# Patient Record
Sex: MALE | Race: WHITE | NOT HISPANIC OR LATINO
[De-identification: names, ages, dates, MRNs, and addresses within clinical notes are randomized per-mention and may not be internally consistent; named-entity substitution may affect disease eponyms.]

---

## 2020-05-20 PROBLEM — Z00.00 ENCOUNTER FOR PREVENTIVE HEALTH EXAMINATION: Status: ACTIVE | Noted: 2020-05-20

## 2020-05-21 ENCOUNTER — APPOINTMENT (OUTPATIENT)
Dept: RADIATION ONCOLOGY | Facility: CLINIC | Age: 65
End: 2020-05-21
Payer: MEDICARE

## 2020-05-21 PROCEDURE — 99442: CPT | Mod: 95

## 2020-05-28 NOTE — DATA REVIEWED
[FreeTextEntry1] : CT scan chest abdomen and pelvis from [facility] 04/20/2020: See HPI\par \par PET CT scan from [facility] 5/1/2020: See HPI

## 2020-05-28 NOTE — HISTORY OF PRESENT ILLNESS
[FreeTextEntry1] : TELEPHONE FOLLOW-UP\par THE PATIENT WAS CALLED ON THE PHONE NUMBER HE PROVIDED [phone]\par HIS WIFE WAS PRESENT FOR AND PARTICIPATED IN THE PHONE CALL\par VERBAL CONSENT WAS OBTAINED FROM THE PATIENT TO PROCEED WITH THE VISIT\par \par Mr. Beckham was last seen in our office for follow-up on October 30, 2019 at which time he was nearly 1 month status post completion of radiation therapy to the GE junction.\par \par Since that time he and his wife have traveled to [place] for the winter.  Because of COVID they have decided to remain in [place].  He had been seeing a medical oncologist in [place] initially for port flushing.  The medical oncologist, Dr.Renjipha Whipple, was concerned with a delay of routine imaging and thus ordered a CAT scan of the thorax abdomen and pelvis at [facility] which was done on April 17 of this year.  This revealed a new nodule in the right upper lung measuring 0.7 x 0.7 cm, retroperitoneal lymphadenopathy measuring up to 3.8 cm and right common external iliac lymphadenopathy measuring up to 1.4 cm.  \par \par A PET CT scan was performed on May 1, 2020 at [facility] in [place].  This revealed a hypermetabolic peritoneal lymphadenopathy, no hypermetabolic mesenteric lymph nodes or external iliac or pelvic sidewall lymph nodes.  There were no hypermetabolic lung nodules.  There is a small 0.6 cm nodule in the right upper lung medially without increased metabolic activity.\par \par He is scheduled to undergo a laparoscopic biopsy with Dr. Eric Young of [facility] on May 28, 2020 at [facility] in [facility].  After his biopsy he will return to his medical oncologist for follow-up on June 1 to discuss management.\par \par He reports that he has been feeling well overall.  He has no fatigue, good appetite, a 20 pound weight gain since completing radiotherapy.  He has no nausea, no vomiting, no dysphagia or diet aphasia.  Chest pain, no cough, sputum production or hemoptysis.  He has no back pain, no bone pain, no anorexia, weight loss or fatigue.\par \par PET CT scan on April 30, 2020 showed a small 0.6 cm nodule in the right upper lobe lung medially without increased metabolic activity.  Mild thickening along the lesser curvature of the stomach with minimal metabolic activity.  Hypermetabolic retroperitoneal lymph nodes compatible with metastatic disease.

## 2020-05-28 NOTE — DISEASE MANAGEMENT
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

## 2020-11-10 ENCOUNTER — APPOINTMENT (OUTPATIENT)
Dept: PULMONOLOGY | Facility: HOSPITAL | Age: 65
End: 2020-11-10
Payer: MEDICARE

## 2020-11-10 VITALS — WEIGHT: 315 LBS | HEIGHT: 70 IN | BODY MASS INDEX: 45.1 KG/M2

## 2020-11-10 DIAGNOSIS — Z87.891 PERSONAL HISTORY OF NICOTINE DEPENDENCE: ICD-10-CM

## 2020-11-10 DIAGNOSIS — M19.90 UNSPECIFIED OSTEOARTHRITIS, UNSPECIFIED SITE: ICD-10-CM

## 2020-11-10 DIAGNOSIS — I10 ESSENTIAL (PRIMARY) HYPERTENSION: ICD-10-CM

## 2020-11-10 DIAGNOSIS — Z78.9 OTHER SPECIFIED HEALTH STATUS: ICD-10-CM

## 2020-11-10 DIAGNOSIS — I25.10 ATHEROSCLEROTIC HEART DISEASE OF NATIVE CORONARY ARTERY W/OUT ANGINA PECTORIS: ICD-10-CM

## 2020-11-10 PROCEDURE — 99213 OFFICE O/P EST LOW 20 MIN: CPT | Mod: 95

## 2020-11-10 RX ORDER — APIXABAN 2.5 MG/1
2.5 TABLET, FILM COATED ORAL
Qty: 180 | Refills: 0 | Status: ACTIVE | COMMUNITY
Start: 2020-10-17

## 2020-11-10 RX ORDER — AMLODIPINE BESYLATE 5 MG/1
5 TABLET ORAL
Qty: 90 | Refills: 0 | Status: ACTIVE | COMMUNITY
Start: 2020-08-03

## 2020-11-10 RX ORDER — ATORVASTATIN CALCIUM 80 MG/1
80 TABLET, FILM COATED ORAL
Qty: 90 | Refills: 0 | Status: ACTIVE | COMMUNITY
Start: 2020-08-03

## 2020-11-10 RX ORDER — APREPITANT 125MG-80MG
80 & 125 KIT ORAL
Qty: 6 | Refills: 0 | Status: ACTIVE | COMMUNITY
Start: 2020-10-31

## 2020-11-10 RX ORDER — METOPROLOL TARTRATE 75 MG/1
TABLET, FILM COATED ORAL
Refills: 0 | Status: ACTIVE | COMMUNITY

## 2020-11-10 RX ORDER — ASPIRIN 81 MG/1
81 TABLET, CHEWABLE ORAL
Qty: 90 | Refills: 0 | Status: ACTIVE | COMMUNITY
Start: 2020-06-15

## 2020-11-10 RX ORDER — PANTOPRAZOLE 40 MG/1
40 TABLET, DELAYED RELEASE ORAL
Qty: 90 | Refills: 0 | Status: ACTIVE | COMMUNITY
Start: 2020-06-15

## 2020-11-10 NOTE — HISTORY OF PRESENT ILLNESS
[FreeTextEntry1] : PMD: Dr. Rell Benavides\par cardiology: Dr. Chip Enriquez\par \par History of present illness:\par 65 year-old man with a history of cad s.p stents jan/18, htn, venous stasis, arthritis, gerd, stage 4 esophageal cancer is [facility] to address sleep apnea.  Patients symptoms are better - snoring and daytime sleepiness improved.\par pt is compliant and benefitted with cpap.\par average usage is 8 hrs\par on auto 9-13 cm\par ahi 1.3\par dme apria\par uses fullface mask

## 2020-11-10 NOTE — ASSESSMENT
[FreeTextEntry1] : 65 year  old man  with sleep apnea is doing well with the CPAP.  Patient is compliant with the CPAP and benefited significantly with the CPAP.\par

## 2020-11-10 NOTE — REASON FOR VISIT
[Follow-Up] : a follow-up visit [Home] : at home, [unfilled] , at the time of the visit. [Medical Office: ([facility])___] : at the medical office located in  [Verbal consent obtained from patient] : the patient, [unfilled] [FreeTextEntry1] : sleep apnea

## 2021-08-26 ENCOUNTER — APPOINTMENT (OUTPATIENT)
Dept: RADIATION ONCOLOGY | Facility: CLINIC | Age: 66
End: 2021-08-26
Payer: MEDICARE

## 2021-08-26 VITALS
RESPIRATION RATE: 20 BRPM | SYSTOLIC BLOOD PRESSURE: 147 MMHG | HEIGHT: 70 IN | BODY MASS INDEX: 45.1 KG/M2 | WEIGHT: 315 LBS | HEART RATE: 65 BPM | TEMPERATURE: 98 F | DIASTOLIC BLOOD PRESSURE: 62 MMHG | OXYGEN SATURATION: 97 %

## 2021-08-26 DIAGNOSIS — C16.0 MALIGNANT NEOPLASM OF CARDIA: ICD-10-CM

## 2021-08-26 PROCEDURE — 99214 OFFICE O/P EST MOD 30 MIN: CPT

## 2021-08-26 RX ORDER — LOSARTAN POTASSIUM 50 MG/1
50 TABLET, FILM COATED ORAL
Refills: 0 | Status: ACTIVE | COMMUNITY

## 2021-08-26 RX ORDER — ONDANSETRON 8 MG/1
8 TABLET, ORALLY DISINTEGRATING ORAL
Refills: 0 | Status: ACTIVE | COMMUNITY

## 2021-08-26 NOTE — VITALS
[Maximal Pain Intensity: 0/10] : 0/10 [Least Pain Intensity: 0/10] : 0/10 [70: Cares for self; unalbe to carry on normal activity or do active work.] : 70: Cares for self; unable to carry on normal activity or do active work. [70: Both greater restriction of and less time spent in play activity.] : 70: Both greater restriction of and less time spent in play activity. [ECOG Performance Status: 2 - Ambulatory and capable of all self care but unable to carry out any work activities] : Performance Status: 2 - Ambulatory and capable of all self care but unable to carry out any work activities. Up and about more than 50% of waking hours

## 2021-08-26 NOTE — REVIEW OF SYSTEMS
[Anal Pain: Grade 0] : Anal Pain: Grade 0 [Constipation: Grade 1 - Occasional or intermittent symptoms; occasional use of stool softeners, laxatives, dietary modification, or enema] : Constipation: Grade 1 - Occasional or intermittent symptoms; occasional use of stool softeners, laxatives, dietary modification, or enema [Diarrhea: Grade 1 - Increase of <4 stools per day over baseline; mild increase in ostomy output compared to baseline] : Diarrhea: Grade 1 - Increase of <4 stools per day over baseline; mild increase in ostomy output compared to baseline [Dysphagia: Grade 0] : Dysphagia: Grade 0 [Nausea: Grade 1 - Loss of appetite without alteration in eating habits] : Nausea: Grade 1 - Loss of appetite without alteration in eating habits [Vomiting: Grade 0] : Vomiting: Grade 0 [Fatigue: Grade 1 - Fatigue relieved by rest] : Fatigue: Grade 1 - Fatigue relieved by rest [Urinary Frequency: Grade 1 - Present] : Urinary Frequency: Grade 1 - Present [Dizziness: Grade 0] : Dizziness: Grade 0  [Headache: Grade 0] : Headache: Grade 0 [Peripheral Motor Neuropathy: Grade 0] : Peripheral Motor Neuropathy: Grade 0 [Peripheral Sensory Neuropathy: Grade 0] : Peripheral Sensory Neuropathy: Grade 0 [Cough: Grade 0] : Cough: Grade 0 [Dyspnea: Grade 1 - Shortness of breath with moderate exertion] : Dyspnea: Grade 1 - Shortness of breath with moderate exertion [Hiccups: Grade 0] : Hiccups: Grade 0 [Hoarseness: Grade 0] : Hoarseness: Grade 0 [Hypoxia: Grade 0] : Hypoxia: Grade 0 [Pharyngeal Mucositis: Grade 0] : Pharyngeal Mucositis: Grade 0 [Pneumonitis: Grade 0] : Pneumonitis: Grade 0 [Voice Alteration: Grade 0] : Voice Alteration: Grade 0

## 2021-08-27 ENCOUNTER — TRANSCRIPTION ENCOUNTER (OUTPATIENT)
Age: 66
End: 2021-08-27

## 2021-08-27 PROBLEM — C16.0 GE JUNCTION CARCINOMA: Status: ACTIVE | Noted: 2020-05-21

## 2021-08-27 NOTE — HISTORY OF PRESENT ILLNESS
[FreeTextEntry1] : concurrent chemotherapy and radiation therapy to a total dose of 5040 cGy to the GE junction and retroperitoneal lymph nodes completed on 10/1/2019. \par \par On 10/30/2019 he was here in our office for follow up after completion of radiation therapy to the GE junction.\par \par On 4/17/2020 a CAT scan was done of the thorax abdomen and pelvis at [facility]. This revealed a new nodule in the right upper lung measuring 0.7 x 0.7 cm, retroperitoneal lymphadenopathy measuring up to 3.8 cm and right common external iliac lymphadenopathy measuring up to 1.4 cm. \par \par On 4/30/2020 he had a PET CT which showed a small 0.6 cm nodule in the right upper lobe lung medially without increased metabolic activity. Mild thickening along the lesser curvature of the stomach with minimal metabolic activity. Hypermetabolic retroperitoneal lymph nodes compatible with metastatic disease. \par \par On 5/21/2020 he was here in our office and saw Dr. Thornton at which time she stated there is evidence of hypermetabolic retroperitoneal lymphadenopathy on PET scan. He is scheduled for laparoscopic biopsy of the retroperitoneal lymph nodes and subsequent follow-up with his medical oncologist for treatment planning. \par \par On 8/26/2020 he had a CT of the CAP which revealed new lymphadenopathy in the peripancreatic region. There is new lymphadenopathy in the retroperitoneum and in the left common iliac region. There are multiple new pulmonary nodules, findings suspicious for metastatic carcinoma possibly from known esophageal primary. Multiple small areas of low density in the liver too small to obtain and decreased attenuation values but appearing stable cince previous CT. Stable periumbilical hernia, fat containing 3.6 x 2.3cm. \par \par On 9/8/2020 he underwent biopsy of a retroperitoneal lymph node 9/8/20 which was positive for PD-L1 positive metastatic adenocarcinoma.\par \par On 10/12/2020 he had a CT of the CAP revealing increased pulmonary metastatic disease. Increased abdominal metastatic lymphadenopathy. \par \par On 10/13/2020 Dr. Oakley started chemotherapy. He was started on Irinotecan every other Wednesday, 5FU every other week via continuous pump starting on Wednesday's, completing on Friday's. Neulasta every Friday, and B 12 injections once a month. The chemotherapy regimen continues every other week. \par \par On 1/4/21 he had a CT of the CAP revealing improvement in pulmonary metastasis, mixed interval response in abdominal and pelvis adenopathy. Stable appearance of distal esophagus extending into gastric fundus. \par \par Buccal brush biopsy 1/24/21 was negative.\par \par On 2/15/21 he had a CT of the [place] revealing pulmonary metastasis are not significantly changed. One nodule in the right upper lobe posteriorly is minimally more prominent. Mild retroperitoneal adenopathy is not significantly changes. No a=change in distal esophagus extending into gastric fundus. \par \par On 5/19/21 he had a CT of the CAP revealing improved wall thickening distal esophagus with small hiatal hernia. Stable malignant mediastinal lymphadenopathy. Stable adenopathy with or without concurrent pancreatic mass in the gastrohepatic ligament region and posterior to the pancreas encasing celiac, hepatic and splenic arteries. Stable 4mm indeterminate hypodensity right liver. Nodular disease in the lungs with at least 1 nodule on side larger than before concerning for progressive metastatic involvement unless a flare form treatment response. Stable ascending aortic aneurysm. Stable coronary calcification. \par \par Dr. Oakley added Optivo in May of 2021. \par \par On 8/3/21 he had a CT of the CAP in comparison from 5/19/21 the lung nodule in the medial aspect of the right upper lobe is unchanged. An additional irregular nodule in the posterior aspect of the right upper lobe is unchanged. In the posterior aspect of the right lower lobe a 0.5cm rounded nodule may have been seen previously and was previously punctate. Subcentimeter subpleural nodularity in the lateral aspect of the left upper lobe is stable. In the left lower lobe the 2cm lobular nodule previously measured 0.7cm. An additional punctate nodule more posteriorly in the left lower lobe is stable. A subcentimeter nodule in the lingula is stable. PAtchy ground-glass opacities in the lateral posterior aspect of the right upper lobe are unchanged. Adenopathy posterior to the pancreas in unchanged. The 1cm lymph node posterior to the inferior vena cava is not significant change, previously 0.9cm. Additional retroperitoneal adenopathy in unchanged. \par

## 2021-08-27 NOTE — PHYSICAL EXAM
[General Appearance - Well Developed] : well developed [General Appearance - Well Nourished] : well nourished [General Appearance - In No Acute Distress] : in no acute distress [Normal] : normoactive bowel sounds, soft and nontender, no hepatosplenomegaly or masses appreciated

## 2021-11-10 ENCOUNTER — APPOINTMENT (OUTPATIENT)
Dept: PULMONOLOGY | Facility: CLINIC | Age: 66
End: 2021-11-10
Payer: MEDICARE

## 2021-11-10 VITALS — HEIGHT: 70 IN | WEIGHT: 315 LBS | BODY MASS INDEX: 45.1 KG/M2

## 2021-11-10 DIAGNOSIS — G47.33 OBSTRUCTIVE SLEEP APNEA (ADULT) (PEDIATRIC): ICD-10-CM

## 2021-11-10 PROCEDURE — 99212 OFFICE O/P EST SF 10 MIN: CPT | Mod: 95

## 2021-11-10 NOTE — HISTORY OF PRESENT ILLNESS
[FreeTextEntry1] : PMD: Dr. Rell Benavides\par cardiology: Dr. Chip Enriquez\par \par 65 year-old man with a history of cad s.p stents jan/18, htn, venous stasis, arthritis, gerd, stage 4 esophageal cancer is here in the [facility] to address sleep apnea.  Patients symptoms are better - snoring and daytime sleepiness improved.\par pt is compliant and benefitted with cpap.\par average usage is 7.6 hrs\par on auto 9-13 cm\par ahi 0.7\par dme apria\par uses fullface mask

## 2021-11-10 NOTE — REASON FOR VISIT
[Home] : at home, [unfilled] , at the time of the visit. [Medical Office: ([facility])___] : at the medical office located in  [Verbal consent obtained from patient] : the patient, [unfilled] [Follow-Up] : a follow-up visit [Sleep Apnea] : sleep apnea